# Patient Record
Sex: MALE | Race: WHITE
[De-identification: names, ages, dates, MRNs, and addresses within clinical notes are randomized per-mention and may not be internally consistent; named-entity substitution may affect disease eponyms.]

---

## 2018-12-12 ENCOUNTER — HOSPITAL ENCOUNTER (EMERGENCY)
Dept: HOSPITAL 80 - FED | Age: 28
Discharge: HOME | End: 2018-12-12
Payer: COMMERCIAL

## 2018-12-12 VITALS — SYSTOLIC BLOOD PRESSURE: 138 MMHG | DIASTOLIC BLOOD PRESSURE: 84 MMHG
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
## 2018-12-12 DIAGNOSIS — S39.81XA: Primary | ICD-10-CM

## 2018-12-12 NOTE — EDPHY
H & P


Stated Complaint: hit L upper mid abd with hockey puck 1 wk ago still painful


Time Seen by Provider: 12/12/18 11:17


HPI/ROS: 





CHIEF COMPLAINT:  Abdominal pain post blunt hockey puck injury





HISTORY OF PRESENT ILLNESS:  28-year-old male via private vehicle complaining 

of abdominal pain for the past 1 week after a hockey puck impacted his 

epigastrium.  He has had continued pain.  No chest pain.  No dyspnea.  No 

Nausea or vomiting.  No melena hematochezia.  No urinary abnormality.  No 

genitalia injury








REVIEW OF SYSTEMS:


10 systems reviewed and negative with the exception of the elements mentioned 

in the history of present illness








PAST MEDICAL/SURGICAL HISTORY: no anticoagulant use, no relevant medical/

surgical history





SOCIAL HISTORY: denies alcohol use at time of incident





*********





PHYSICAL EXAM 





1) GENERAL: Well-developed, well-nourished, alert and oriented.  Appears to be 

in no acute distress. Answering questions appropriately.


2) HEAD: Normocephalic, atraumatic


3) HEENT: Pupils equal, round, reactive to light bilaterally..


4) NECK:  . Posterior cervical spine is nontender, no stepoff, no effusion. 

Full range of motion which does not elicit any midline cervical spine pain, no 

posterior midline tenderness, no step-off.


5) LUNGS: Clear to auscultation bilaterally, no wheezes, no rhonchi, no 

retractions.  No obvious signs of trauma.  No chest wall pain.  No flaring, no 

grunting.  Moving symmetrically.  No crepitus. 


6) HEART: [Regular rate and rhythm, 


7) ABDOMEN: Ecchymosis and tenderness to palpation left of midline in the 

epigastrium with associated tenderness.  Soft compartments.  No hematoma.


8) MUSCULOSKELETAL: Moving all extremities, no focal areas of tenderness, no 

obvious trauma.


9) BACK:  No midline vertebral tenderness, no fluctuance, no step-off, no 

obvious trauma, no visual or palpable abnormality.


10) SKIN:   No laceration.  No abrasion





***********





DIFFERENTIAL DIAGNOSIS:  In no particular order including but not limited to 

abdominal compartment syndrome, traumatic solid organ injury, abdominal wall 

hematoma, contusion 





- Medical/Surgical History


Hx Asthma: No


Hx Chronic Respiratory Disease: No


Hx Diabetes: No


Hx Cardiac Disease: No


Hx Renal Disease: No


Hx Cirrhosis: No


Hx Alcoholism: No


Hx HIV/AIDS: No


Hx Splenectomy or Spleen Trauma: No


Other PMH: denies





- Social History


Smoking Status: Never smoked


Constitutional: 


 Initial Vital Signs











Temperature (C)  36.6 C   12/12/18 11:06


 


Heart Rate  60   12/12/18 11:06


 


Respiratory Rate  16   12/12/18 11:06


 


Blood Pressure  151/86 H  12/12/18 11:06


 


O2 Sat (%)  97   12/12/18 11:06








 











O2 Delivery Mode               Room Air














Allergies/Adverse Reactions: 


 





No Known Allergies Allergy (Unverified 12/12/18 11:05)


 








Home Medications: 














 Medication  Instructions  Recorded


 


NK [No Known Home Meds]  12/12/18














ED Images





- Male Images


Male Torso Head Front/Back: 


  __________________________














  __________________________





 1 - Pain and ecchymosis








Medical Decision Making





- Diagnostics


Imaging Results: 


 Imaging Impressions





Abdomen CT  12/12/18 11:25


Impression: Subcutaneous stranding left upper quadrant suggesting contusion 

with no acute intra-abdominal findings.


 


Findings discussed with LULI Rodriguez 12/12/2018 at 12:30. 








Images reviewed myself


ED Course/Re-evaluation: 





11:20 a.m.:  Will obtain CT imaging the abdomen and pelvis to evaluate solid 

organ injury.  Indications risks benefits discussed with patient and he 

consents.  Care of patient under supervision of  secondary supervising 

physician Dr Hand .





Re-evaluation after imaging results.  Discussed his imaging results with him.  

No evidence of solid organ injury.  Doubt abdominal compartment syndrome.  

Feels comfortable being discharged home.  Given my usual and customary 

discharge precautions instructions.





- Data Points


Laboratory Results: 


 











  12/12/18





  11:31


 


POC Hgb  15.6 gm/dL gm/dL





   (13.7-17.5) 


 


POC Hct  46 % %





   (40-51) 


 


POC Sodium  141 mEq/L mEq/L





   (135-145) 


 


POC Potassium  3.8 mEq/L mEq/L





   (3.3-5.0) 


 


POC Chloride  100 mEq/L mEq/L





   () 


 


POC BUN  14 mg/dL mg/dL





   (7-23) 


 


POC Creatinine  1.0 mg/dL mg/dL





   (0.7-1.3) 


 


POC Glucose  80 mg/dL mg/dL





   () 











Point of Care Test Results: 


 Chemistry











  12/12/18





  11:31


 


POC Sodium  141 mEq/L mEq/L





   (135-145) 


 


POC Potassium  3.8 mEq/L mEq/L





   (3.3-5.0) 


 


POC Chloride  100 mEq/L mEq/L





   () 


 


POC BUN  14 mg/dL mg/dL





   (7-23) 


 


POC Creatinine  1.0 mg/dL mg/dL





   (0.7-1.3) 


 


POC Glucose  80 mg/dL mg/dL





   () 








 ISTAT H&H











  12/12/18





  11:31


 


POC Hgb  15.6 gm/dL gm/dL





   (13.7-17.5) 


 


POC Hct  46 % %





   (40-51) 














Departure





- Departure


Disposition: Home, Routine, Self-Care


Clinical Impression: 


 Activity, ice hockey





Blunt injury of abdomen


Qualifiers:


 Encounter type: initial encounter Qualified Code(s): S39.81XA - Other 

specified injuries of abdomen, initial encounter





Condition: Good


Instructions:  Contusion in Adults (ED)


Additional Instructions: 


Return to the emergency department if you develop worsening pain, if you 

develop blood in your stool or any other symptoms that concern you


Referrals: 


Elton Melendez MD [Medical Doctor] - 2-3 days, if not improved